# Patient Record
Sex: MALE | Race: OTHER | NOT HISPANIC OR LATINO
[De-identification: names, ages, dates, MRNs, and addresses within clinical notes are randomized per-mention and may not be internally consistent; named-entity substitution may affect disease eponyms.]

---

## 2021-02-23 ENCOUNTER — TRANSCRIPTION ENCOUNTER (OUTPATIENT)
Age: 26
End: 2021-02-23

## 2021-03-12 ENCOUNTER — TRANSCRIPTION ENCOUNTER (OUTPATIENT)
Age: 26
End: 2021-03-12

## 2021-03-16 ENCOUNTER — TRANSCRIPTION ENCOUNTER (OUTPATIENT)
Age: 26
End: 2021-03-16

## 2021-04-09 ENCOUNTER — TRANSCRIPTION ENCOUNTER (OUTPATIENT)
Age: 26
End: 2021-04-09

## 2021-04-14 ENCOUNTER — TRANSCRIPTION ENCOUNTER (OUTPATIENT)
Age: 26
End: 2021-04-14

## 2022-02-11 PROBLEM — Z00.00 ENCOUNTER FOR PREVENTIVE HEALTH EXAMINATION: Status: ACTIVE | Noted: 2022-02-11

## 2022-02-15 ENCOUNTER — APPOINTMENT (OUTPATIENT)
Dept: OTOLARYNGOLOGY | Facility: CLINIC | Age: 27
End: 2022-02-15
Payer: COMMERCIAL

## 2022-02-15 VITALS — BODY MASS INDEX: 27 KG/M2 | WEIGHT: 168 LBS | HEIGHT: 66 IN

## 2022-02-15 VITALS
DIASTOLIC BLOOD PRESSURE: 86 MMHG | TEMPERATURE: 98.5 F | HEART RATE: 92 BPM | SYSTOLIC BLOOD PRESSURE: 147 MMHG | OXYGEN SATURATION: 97 %

## 2022-02-15 PROCEDURE — 99204 OFFICE O/P NEW MOD 45 MIN: CPT | Mod: 25

## 2022-02-15 PROCEDURE — 87070 CULTURE OTHR SPECIMN AEROBIC: CPT

## 2022-02-15 PROCEDURE — 31231 NASAL ENDOSCOPY DX: CPT

## 2022-02-15 RX ORDER — MUPIROCIN 20 MG/G
2 OINTMENT TOPICAL
Qty: 1 | Refills: 4 | Status: ACTIVE | COMMUNITY
Start: 2022-02-15 | End: 1900-01-01

## 2022-02-17 ENCOUNTER — APPOINTMENT (OUTPATIENT)
Dept: CT IMAGING | Facility: HOSPITAL | Age: 27
End: 2022-02-17

## 2022-02-17 ENCOUNTER — OUTPATIENT (OUTPATIENT)
Dept: OUTPATIENT SERVICES | Facility: HOSPITAL | Age: 27
LOS: 1 days | End: 2022-02-17
Payer: COMMERCIAL

## 2022-02-17 PROCEDURE — 70486 CT MAXILLOFACIAL W/O DYE: CPT

## 2022-02-17 PROCEDURE — 70486 CT MAXILLOFACIAL W/O DYE: CPT | Mod: 26

## 2022-02-22 LAB — EAR NOSE AND THROAT CULTURE: ABNORMAL

## 2022-02-23 ENCOUNTER — APPOINTMENT (OUTPATIENT)
Dept: OTOLARYNGOLOGY | Facility: CLINIC | Age: 27
End: 2022-02-23
Payer: COMMERCIAL

## 2022-02-23 DIAGNOSIS — J34.3 HYPERTROPHY OF NASAL TURBINATES: ICD-10-CM

## 2022-02-23 DIAGNOSIS — J32.8 OTHER CHRONIC SINUSITIS: ICD-10-CM

## 2022-02-23 DIAGNOSIS — R04.0 EPISTAXIS: ICD-10-CM

## 2022-02-23 DIAGNOSIS — J34.2 DEVIATED NASAL SEPTUM: ICD-10-CM

## 2022-02-23 DIAGNOSIS — R51.9 HEADACHE, UNSPECIFIED: ICD-10-CM

## 2022-02-23 PROCEDURE — 99213 OFFICE O/P EST LOW 20 MIN: CPT

## 2022-02-23 NOTE — PROCEDURE
[Congested] : congested [Deviated to the Lt] : deviated to the left [FreeTextEntry6] : Turbinate Hypertrophy,  Deviated Nasal Septum.  Epistaxis.  Purulent post nasal discharge > on the right side \par Severe rhinitis ? MRSA [de-identified] :  Turbinate Hypertrophy,  Deviated Nasal Septum.  Epistaxis.  Purulent post nasal discharge > on the right side

## 2022-02-23 NOTE — HISTORY OF PRESENT ILLNESS
[de-identified] : 26 Years old male patient with history of Persistent nasal congestion and bloody mucus since August , 2021.  Patient is present today in the office with  Sinus CT-Impression Turbinate Hypertrophy,  Deviated Nasal Septum.  Epistaxis.  Improved Purulent post nasal discharge > on the right side . Improved  Severe rhinitis

## 2022-02-23 NOTE — REVIEW OF SYSTEMS
[Patient Intake Form Reviewed] : Patient intake form was reviewed [Ear Pain] : ear pain [Ear Noises] : ear noises [Nasal Congestion] : nasal congestion [Nose Bleeds] : nose bleeds [Sinus Pain] : sinus pain [Sinus Pressure] : sinus pressure [As Noted in HPI] : as noted in HPI [Swelling Neck] : swelling neck [Negative] : Heme/Lymph [FreeTextEntry4] : Headaches, Neck pain

## 2022-02-23 NOTE — REASON FOR VISIT
[Subsequent Evaluation] : a subsequent evaluation for [FreeTextEntry2] : Persistent nasal congestion and bloody mucus.

## 2022-02-23 NOTE — PHYSICAL EXAM
[] : septum deviated bilaterally [Midline] : trachea located in midline position [Normal] : no rashes [de-identified] : Turbinate Hypertrophy,  Deviated Nasal Septum.  Epistaxis.  Purulent post nasal discharge > on the right side \par Severe rhinitis ? MRSA

## 2022-03-07 NOTE — HISTORY OF PRESENT ILLNESS
[de-identified] : 26 Years old male patient with history of Persistent nasal congestion and bloody mucus since August , 2021.  Patient is present today in the office with Turbinate Hypertrophy,  Deviated Nasal Septum.  Epistaxis.  Purulent post nasal discharge > on the right side \par Severe rhinitis ? MRSA

## 2022-03-07 NOTE — PHYSICAL EXAM
[] : septum deviated bilaterally [Normal] : mucosa is normal [Midline] : trachea located in midline position [de-identified] : Turbinate Hypertrophy,  Deviated Nasal Septum.  Epistaxis.  Purulent post nasal discharge > on the right side \par Severe rhinitis ? MRSA

## 2022-03-07 NOTE — CONSULT LETTER
[Dear  ___] : Dear  [unfilled], [Consult Letter:] : I had the pleasure of evaluating your patient, [unfilled]. [Please see my note below.] : Please see my note below. [Consult Closing:] : Thank you very much for allowing me to participate in the care of this patient.  If you have any questions, please do not hesitate to contact me. [Sincerely,] : Sincerely, [FreeTextEntry3] : Navneet Buckner MD, FACS\par Professor of Otolaryngology, [employer] at [facility]\par Director, [facility], Department of Otolaryngology, [facility]\par , Head & Neck Service Line, [facility]\par

## 2022-03-07 NOTE — REASON FOR VISIT
[Initial Evaluation] : an initial evaluation for [FreeTextEntry2] : Persistent nasal congestion and bloody mucus since August , 2021  Patient states his level of severity is a level 8 out of 10 and it occurs constant.  Patient states nothing helps to improve or worsens his Persistent nasal congestion and bloody mucus since August , 2021